# Patient Record
Sex: MALE | Race: WHITE | Employment: FULL TIME | URBAN - METROPOLITAN AREA
[De-identification: names, ages, dates, MRNs, and addresses within clinical notes are randomized per-mention and may not be internally consistent; named-entity substitution may affect disease eponyms.]

---

## 2019-05-08 ENCOUNTER — HOSPITAL ENCOUNTER (EMERGENCY)
Age: 29
Discharge: HOME | End: 2019-05-08
Attending: EMERGENCY MEDICINE
Payer: SELF-PAY

## 2019-05-08 ENCOUNTER — APPOINTMENT (OUTPATIENT)
Dept: GENERAL RADIOLOGY | Age: 29
End: 2019-05-08
Attending: PHYSICIAN ASSISTANT
Payer: SELF-PAY

## 2019-05-08 VITALS
HEART RATE: 86 BPM | WEIGHT: 269 LBS | HEIGHT: 73 IN | BODY MASS INDEX: 35.65 KG/M2 | DIASTOLIC BLOOD PRESSURE: 83 MMHG | RESPIRATION RATE: 18 BRPM | TEMPERATURE: 98.2 F | SYSTOLIC BLOOD PRESSURE: 131 MMHG | OXYGEN SATURATION: 99 %

## 2019-05-08 DIAGNOSIS — J06.9 UPPER RESPIRATORY TRACT INFECTION, UNSPECIFIED TYPE: Primary | ICD-10-CM

## 2019-05-08 PROCEDURE — 99282 EMERGENCY DEPT VISIT SF MDM: CPT

## 2019-05-08 PROCEDURE — 71046 X-RAY EXAM CHEST 2 VIEWS: CPT

## 2019-05-09 NOTE — ED TRIAGE NOTES
Patient states cough x couple weeks. He denies any dx hx of asthma. States prior hx of same symptoms that resolved with use of albuterol.

## 2019-05-09 NOTE — DISCHARGE INSTRUCTIONS
Patient Education        Upper Respiratory Infection (Cold): Care Instructions  Your Care Instructions    An upper respiratory infection, or URI, is an infection of the nose, sinuses, or throat. URIs are spread by coughs, sneezes, and direct contact. The common cold is the most frequent kind of URI. The flu and sinus infections are other kinds of URIs. Almost all URIs are caused by viruses. Antibiotics won't cure them. But you can treat most infections with home care. This may include drinking lots of fluids and taking over-the-counter pain medicine. You will probably feel better in 4 to 10 days. The doctor has checked you carefully, but problems can develop later. If you notice any problems or new symptoms, get medical treatment right away. Follow-up care is a key part of your treatment and safety. Be sure to make and go to all appointments, and call your doctor if you are having problems. It's also a good idea to know your test results and keep a list of the medicines you take. How can you care for yourself at home? · To prevent dehydration, drink plenty of fluids, enough so that your urine is light yellow or clear like water. Choose water and other caffeine-free clear liquids until you feel better. If you have kidney, heart, or liver disease and have to limit fluids, talk with your doctor before you increase the amount of fluids you drink. · Take an over-the-counter pain medicine, such as acetaminophen (Tylenol), ibuprofen (Advil, Motrin), or naproxen (Aleve). Read and follow all instructions on the label. · Before you use cough and cold medicines, check the label. These medicines may not be safe for young children or for people with certain health problems. · Be careful when taking over-the-counter cold or flu medicines and Tylenol at the same time. Many of these medicines have acetaminophen, which is Tylenol. Read the labels to make sure that you are not taking more than the recommended dose.  Too much acetaminophen (Tylenol) can be harmful. · Get plenty of rest.  · Do not smoke or allow others to smoke around you. If you need help quitting, talk to your doctor about stop-smoking programs and medicines. These can increase your chances of quitting for good. When should you call for help? Call 911 anytime you think you may need emergency care. For example, call if:    · You have severe trouble breathing.    Call your doctor now or seek immediate medical care if:    · You seem to be getting much sicker.     · You have new or worse trouble breathing.     · You have a new or higher fever.     · You have a new rash.    Watch closely for changes in your health, and be sure to contact your doctor if:    · You have a new symptom, such as a sore throat, an earache, or sinus pain.     · You cough more deeply or more often, especially if you notice more mucus or a change in the color of your mucus.     · You do not get better as expected. Where can you learn more? Go to http://isabel-carlo.info/. Enter I864 in the search box to learn more about \"Upper Respiratory Infection (Cold): Care Instructions. \"  Current as of: September 5, 2018  Content Version: 11.9  © 0417-9467 [employer]. Care instructions adapted under license by [employer] (which disclaims liability or warranty for this information). If you have questions about a medical condition or this instruction, always ask your healthcare professional. [place] [address] any warranty or liability for your use of this information.

## 2019-05-09 NOTE — ED NOTES
I have reviewed discharge instructions with the patient. The patient verbalized understanding. Patient armband removed and shredded Patient Discharged in stable condition.

## 2019-05-09 NOTE — ED PROVIDER NOTES
Mini Owusu is a 33 y.o. Male with no past medical history coming in complaining of cough. Patient states that for the last couple weeks he has had a nonproductive cough. He also reports a lot of nasal congestion and rhinorrhea which started today. He also states that his sinuses have been acting up. He denies any chest pain, fever, chills, hemoptysis, leg swelling, or history of DVT/PE. States that he went running earlier today and did not have any problems, but came home and try to nap and felt like he could not get a full breath in. States that a friend was recently diagnosed with pneumonia so he is concerned. He also states that he has had similar symptoms to this in the past and was treated with albuterol MDI, but is never been diagnosed with asthma. Does report smoking. Past Medical History:  
Diagnosis Date  Anxiety Past Surgical History:  
Procedure Laterality Date  HX ORTHOPAEDIC Left   
 surgical repair - compound fracture of wrist  
 HX WISDOM TEETH EXTRACTION History reviewed. No pertinent family history. Social History Socioeconomic History  Marital status: SINGLE Spouse name: Not on file  Number of children: Not on file  Years of education: Not on file  Highest education level: Not on file Occupational History  Not on file Social Needs  Financial resource strain: Not on file  Food insecurity:  
  Worry: Not on file Inability: Not on file  Transportation needs:  
  Medical: Not on file Non-medical: Not on file Tobacco Use  Smoking status: Current Every Day Smoker  Smokeless tobacco: Never Used Substance and Sexual Activity  Alcohol use: Yes  Drug use: Yes Types: Marijuana, Cocaine  Sexual activity: Not on file Lifestyle  Physical activity:  
  Days per week: Not on file Minutes per session: Not on file  Stress: Not on file Relationships  Social connections: Talks on phone: Not on file Gets together: Not on file Attends Jewish service: Not on file Active member of club or organization: Not on file Attends meetings of clubs or organizations: Not on file Relationship status: Not on file  Intimate partner violence:  
  Fear of current or ex partner: Not on file Emotionally abused: Not on file Physically abused: Not on file Forced sexual activity: Not on file Other Topics Concern  Not on file Social History Narrative  Not on file ALLERGIES: Patient has no known allergies. Review of Systems Constitutional: Negative. Negative for chills and fever. HENT: Positive for congestion and rhinorrhea. Eyes: Negative. Negative for visual disturbance. Respiratory: Positive for cough. Negative for shortness of breath. Cardiovascular: Negative. Negative for chest pain and leg swelling. Gastrointestinal: Negative. Negative for abdominal pain, nausea and vomiting. Genitourinary: Negative. Negative for dysuria. Musculoskeletal: Negative. Negative for myalgias. Skin: Negative. Negative for rash. Neurological: Negative. Negative for dizziness, weakness and light-headedness. Psychiatric/Behavioral: Negative. All other systems reviewed and are negative. Vitals:  
 05/08/19 2107 BP: 131/83 Pulse: 86 Resp: 18 Temp: 98.2 °F (36.8 °C) SpO2: 99% Weight: 122 kg (269 lb) Height: 6' 1\" (1.854 m) Physical Exam  
Constitutional: He is oriented to person, place, and time. He appears well-developed and well-nourished. No distress. HENT:  
Head: Normocephalic and atraumatic. Eyes: Pupils are equal, round, and reactive to light. EOM are normal.  
Neck: Normal range of motion. Neck supple. No JVD present. Cardiovascular: Normal rate, regular rhythm, S1 normal and S2 normal. Exam reveals no gallop and no friction rub. No murmur heard. Pulmonary/Chest: Effort normal and breath sounds normal. No accessory muscle usage. No respiratory distress. Abdominal: Soft. Normal appearance. He exhibits no distension. There is no tenderness. There is no rigidity. Musculoskeletal: Normal range of motion. He exhibits no edema or tenderness. Neurological: He is alert and oriented to person, place, and time. He has normal strength. No cranial nerve deficit. Coordination normal.  
Skin: Skin is warm and intact. No rash noted. Psychiatric: He has a normal mood and affect. His speech is normal and behavior is normal.  
Vitals reviewed. MDM Number of Diagnoses or Management Options Upper respiratory tract infection, unspecified type:  
Diagnosis management comments: Kiana Dee is a 33 y.o. Male smoker coming in with cough and URI symptoms. Exam negative for any evidence of bronchospasm or obstructive lung disease. Patient states he ran earlier without any difficulty. O2 sats 99%, respiratory rate 18, heart rate in the 80s. PERC negative. Chest x-ray clear. No evidence of pneumonia or obstructive lung disease. Do not feel he needs any further work-up at this time. Counseled extensively on smoking cessation and given return precautions for worsening symptoms. Otherwise instructed to follow-up with his PCP. Procedures Vitals: 
Patient Vitals for the past 12 hrs: 
 Temp Pulse Resp BP SpO2  
05/08/19 2107 98.2 °F (36.8 °C) 86 18 131/83 99 % Disposition: 
Diagnosis: 1. Upper respiratory tract infection, unspecified type Disposition: Discharge Follow-up Information Follow up With Specialties Details Why Contact Info [id number] [address] [facility] EMERGENCY DEPT Emergency Medicine  As needed, If symptoms worsen [address] [place] [id number] [phone] Patient's Medications No medications on file